# Patient Record
Sex: MALE | Race: WHITE | Employment: OTHER | URBAN - NONMETROPOLITAN AREA
[De-identification: names, ages, dates, MRNs, and addresses within clinical notes are randomized per-mention and may not be internally consistent; named-entity substitution may affect disease eponyms.]

---

## 2022-12-18 NOTE — PROGRESS NOTES
[facility]   Date Of Service: 12/22/2022  Provider: Clare Ashton DO, DO  Name: Herbert Henderson   MRN: [id number]    Chief Complaint(s)      Chief Complaint   Patient presents with    New Patient     Possible rheumatoid arthritis vs weakness of hands     Pt stated pain 3/10 - Lower back, bilateral thumbs. States he has weakness in bilateral hands. History of Present illness (HPI)    Herbert Henderson   is a(n)82 y.o. male with a hx of   referred by Zac Chatterjee DO for evaluation of  possble Rheuamtoid arthritis vs weakness of hands    Pain along the bilat thumb, weakness bilat hands since 2021  and progressively worsening. Limited ROm of the fingers for the past year. Weakness hands for the sapphire year. Evaluation by PCP who found esr elevation and low positive CCP. Lower back pain x 8 years w/o preceeding injury. \"Sciatiac\" bilat typically with prolonged standing. Prior left wrist fracture around 7-10 year. Currently having difficulty with toileting, buttons. Lifting a gallon of milk w/ the hands. Arthralgia w/ aching  tyipcally greatest in mornings, ranging from 3-5/10. Aggravating factors: cold warmth, and weather changes Back - prolonged sitting, standing. Hands - increased use. Alleviating factors: mobic - some relief  (daily dosing cuased anemia) , warmth. Previous therapy: none. Associated symptoms:   Swelling - intermittent swelling of the hands reported. AM stiffness 5 min. Numbness ssensation in the along the pips bilateral hands.    + gelling      -denies Photosenstivity, Rash, dry mouth/dry eyes, oral/nasal sores, Raynaud's, digital ulcerations, skin tightening, renal disease,foamy urination, hematuria, sz's, blood clots, , AIHA,leukpenia/lymphopenia, thrombocytopenia, hair loss, serositis, arthritis.      - denies enthesitis, dactylitis, nail changes, hx of STD,  personal or family history of Psoriatic arthritis, psoriasis, ank spond,       Cancer screening: up to date. Review of Systems    Review of Systems   Constitutional: Negative. HENT: Negative. Eyes: Negative. Respiratory:  Positive for cough (sputum wtih thick white sputum) and shortness of breath. Cardiovascular:  Positive for leg swelling (x 6-7 months (dependent)). Gastrointestinal: Negative. Endocrine: Negative. Genitourinary: Negative. Musculoskeletal:  Positive for arthralgias. Skin: Negative. Neurological:  Positive for weakness and numbness. Hematological: Negative. PAST MEDICAL HISTORY     has a past medical history of Hypertension. PAST SURGICAL HISTORY     has a past surgical history that includes Cervical disc surgery (1985). FAMILY HISTORY      Family History   Problem Relation Age of Onset    Arthritis Brother        SOCIAL HISTORY     reports that he has been smoking cigarettes. He has been smoking an average of .5 packs per day. He has never used smokeless tobacco. He reports current alcohol use. He reports that he does not use drugs. ALLERGIES   No Known Allergies    CURRENT MEDICATIONS      Current Outpatient Medications:     amLODIPine (NORVASC) 10 MG tablet, TAKE 1 TABLET BY MOUTH EVERY DAY, Disp: , Rfl:     metoprolol succinate (TOPROL XL) 100 MG extended release tablet, TAKE 1 TABLET (100 MG) BY ORAL ROUTE ONCE DAILY, STOP LABETALOL, Disp: , Rfl:     Glucosamine 500 MG CAPS, Take by mouth, Disp: , Rfl:     Misc Natural Products (PYCNOGENOL COMPLEX PO), Take by mouth, Disp: , Rfl:     PHYSICAL EXAMINATION / OBJECTIVE     Objective:  BP (!) 172/80 (Site: Left Upper Arm, Position: Sitting, Cuff Size: Large Adult)   Pulse 63   Ht 5' 8\" (1.727 m)   Wt 218 lb (98.9 kg)   SpO2 95%   BMI 33.15 kg/m²     Physical Exam      General Appearance:   alert and oriented to person, place and time well-developed and well nourished  Physch : appropriate affects ,   Head:  Normocephalic and atraumatic  Eyes: No gross abnormalities. ,  PERRL, Sclera nonicteric, conjunctiva non-INJECTED  ENT:  MMM,  no deformities , NO oral/nasal sores, Non-tender sinuses. Neck:  Neck supple, Non-tender, No  mass, thyromegaly,    Lymph:  No cervical  or  supraclavicular lymph node swelling. Pulmonary/Chest:  CTA bilat. , normal air movement, no respiratory distress  Cardiovascular:  Normal rate, + S1 and S2,  NO murmurs , rubs, gallups,     * edema  Neurologic:  gait and coordination normal and speech normal  Skin:  Skin color and temp ,  No rashes or lesions  -- onycholysis toenails. Extremities:  No clubbing ,     Musculoskeletal:  3/5  strength,  4/5 elbow ext,  4+ to 5 shoulder, biceps,   DTR 1/4 right bicpe. 2/4 left bicep, triceps, bracioradialis    Upper extremities:    -- forearm atrophy bilat   SHOULDERS - tender bilateral.  ,   ELBOWS +  tinel bilat ,   WRISTS  + tinel left   HANDS/FINGERS - inability to make a composit fist.    Finkelstein test bilat,    Cmc grind     Lower extremities:  HIPS nt  KNEES nt  ANKLES nt   FEET : tender mtps bilat     Spine:   C-spine, T-spine & L-spine:  Non-tender , ROM  limited ,  +  shober @ 11.5 cm, negativ  pipo, +  Occiput to wall , SLR/Cross SLR.        LABS        CBC  Lab Results   Component Value Date/Time    WBC 6.9 12/22/2022 10:06 AM    RBC 4.53 12/22/2022 10:06 AM    HGB 14.3 12/22/2022 10:06 AM    HCT 43.1 12/22/2022 10:06 AM    MCV 95.1 12/22/2022 10:06 AM    MCH 31.6 12/22/2022 10:06 AM    MCHC 33.2 12/22/2022 10:06 AM     12/22/2022 10:06 AM       CMP  No results found for: GLU, CALCIUM, LABALBU, PROT, NA, K, CO2, CL, BUN, CREATININE, ALKPHOS, ALT, AST    HgBA1c: No components found for: HGBA1C    No results found for: TSHFT4, TSH  No results found for: VITD25      No results found for: ANASCRN  No results found for: SSA  No results found for: SSB  No results found for: ANTI-SMITH  No results found for: DSDNAAB   No results found for: ANTIRNP  No results found for: C3, C4  No results found for: CCPAB  No results found for: RF    No components found for: CANCASCRN, APANCASCRN  No results found for: SEDRATE  No results found for: CRP    Esr 31 mm/hr, (<20)  CCP : 10.8  (>3 positive)     RADIOLOGY:       ASSESSMENT/PLAN      1. Polyarthralgia    2. Chronic midline low back pain without sciatica    3. Hand muscle weakness    4. Paresthesia and pain of both upper extremities          # Polyarthralgia   # Chronic low backp ain w/ limited ROm and bilateral radiculopathy   # CCP  10 (<3.1)  # Esr elevation     - hands  and lower back. Back pain x 8 years, hands 2021. Weakness of hands , forearm astroph. Denies AM stiffness. + gelling. Relief w/ mobic. Exam with no synovitis. + tender MTPs, and finkelstein test bilateral. Inability to make a fist bilateral. Mild nail changes. No personal or family hx of CTD. Brother w/ arthritis. -- evaluation for spondyloarthropathy, rheumatoid arthritis or other connective tissue disease as outlined below. # H/o cervical spine surgery   # Bilat hands weakness  # Partesthesia  bilat forearms   - ? Cervical radiculopathy vs entropmentneuropathy . Inclusion body myositis is possible given the distal extremity weakness,     1. Polyarthralgia    2. Chronic midline low back pain without sciatica    3. Hand muscle weakness    4. Paresthesia and pain of both upper extremities  -     CK; Future  -     Aldolase; Future  -     Lactate Dehydrogenase; Future  -     XR HAND RIGHT (MIN 3 VIEWS); Future  -     XR Cervical Spine 2 or 3 VW; Future  -     XR SACROILIAC JOINTS (MIN 3 VIEWS); Future  -     XR LUMBAR SPINE (2-3 VIEWS); Future  -     CBC with Auto Differential; Future  -     Comprehensive Metabolic Panel; Future  -     Sedimentation Rate; Future  -     C-Reactive Protein; Future  -     Electrophoresis Protein, Serum; Future        Return in about 3 months (around 3/22/2023).     Electronically signed by Barry Thompson DO on 12/22/2022 at 12:03 PM    New Prescriptions    No medications on file       12/22/2022       The risks and benefits of my recommendations, as well as other treatment options, benefits and side effects were discussed with the patient today. Questions were answered. Thank you for allowing me to participate in the care of this patient. Please call if there are any questions.

## 2022-12-22 ENCOUNTER — NURSE ONLY (OUTPATIENT)
Dept: LAB | Age: 82
End: 2022-12-22

## 2022-12-22 ENCOUNTER — OFFICE VISIT (OUTPATIENT)
Dept: RHEUMATOLOGY | Age: 82
End: 2022-12-22
Payer: MEDICARE

## 2022-12-22 VITALS
DIASTOLIC BLOOD PRESSURE: 80 MMHG | BODY MASS INDEX: 33.04 KG/M2 | HEIGHT: 68 IN | HEART RATE: 63 BPM | OXYGEN SATURATION: 95 % | WEIGHT: 218 LBS | SYSTOLIC BLOOD PRESSURE: 172 MMHG

## 2022-12-22 DIAGNOSIS — R20.2 PARESTHESIA AND PAIN OF BOTH UPPER EXTREMITIES: ICD-10-CM

## 2022-12-22 DIAGNOSIS — M25.50 POLYARTHRALGIA: ICD-10-CM

## 2022-12-22 DIAGNOSIS — M54.50 CHRONIC MIDLINE LOW BACK PAIN WITHOUT SCIATICA: ICD-10-CM

## 2022-12-22 DIAGNOSIS — M79.602 PARESTHESIA AND PAIN OF BOTH UPPER EXTREMITIES: ICD-10-CM

## 2022-12-22 DIAGNOSIS — M25.50 POLYARTHRALGIA: Primary | ICD-10-CM

## 2022-12-22 DIAGNOSIS — M62.81 HAND MUSCLE WEAKNESS: ICD-10-CM

## 2022-12-22 DIAGNOSIS — M79.601 PARESTHESIA AND PAIN OF BOTH UPPER EXTREMITIES: ICD-10-CM

## 2022-12-22 DIAGNOSIS — G89.29 CHRONIC MIDLINE LOW BACK PAIN WITHOUT SCIATICA: ICD-10-CM

## 2022-12-22 LAB
ALBUMIN SERPL-MCNC: 4.3 G/DL (ref 3.5–5.1)
ALP BLD-CCNC: 99 U/L (ref 38–126)
ALT SERPL-CCNC: 22 U/L (ref 11–66)
ANION GAP SERPL CALCULATED.3IONS-SCNC: 14 MEQ/L (ref 8–16)
AST SERPL-CCNC: 23 U/L (ref 5–40)
BASOPHILS # BLD: 1 %
BASOPHILS ABSOLUTE: 0.1 THOU/MM3 (ref 0–0.1)
BILIRUB SERPL-MCNC: 0.4 MG/DL (ref 0.3–1.2)
BUN BLDV-MCNC: 14 MG/DL (ref 7–22)
C-REACTIVE PROTEIN: < 0.3 MG/DL (ref 0–1)
CALCIUM SERPL-MCNC: 9.8 MG/DL (ref 8.5–10.5)
CHLORIDE BLD-SCNC: 101 MEQ/L (ref 98–111)
CO2: 24 MEQ/L (ref 23–33)
CREAT SERPL-MCNC: 0.5 MG/DL (ref 0.4–1.2)
EOSINOPHIL # BLD: 3.9 %
EOSINOPHILS ABSOLUTE: 0.3 THOU/MM3 (ref 0–0.4)
ERYTHROCYTE [DISTWIDTH] IN BLOOD BY AUTOMATED COUNT: 12.9 % (ref 11.5–14.5)
ERYTHROCYTE [DISTWIDTH] IN BLOOD BY AUTOMATED COUNT: 45.3 FL (ref 35–45)
GFR SERPL CREATININE-BSD FRML MDRD: > 60 ML/MIN/1.73M2
GLUCOSE BLD-MCNC: 105 MG/DL (ref 70–108)
HCT VFR BLD CALC: 43.1 % (ref 42–52)
HEMOGLOBIN: 14.3 GM/DL (ref 14–18)
IMMATURE GRANS (ABS): 0.02 THOU/MM3 (ref 0–0.07)
IMMATURE GRANULOCYTES: 0.3 %
LD: 211 U/L (ref 100–190)
LYMPHOCYTES # BLD: 32.8 %
LYMPHOCYTES ABSOLUTE: 2.3 THOU/MM3 (ref 1–4.8)
MCH RBC QN AUTO: 31.6 PG (ref 26–33)
MCHC RBC AUTO-ENTMCNC: 33.2 GM/DL (ref 32.2–35.5)
MCV RBC AUTO: 95.1 FL (ref 80–94)
MONOCYTES # BLD: 8.5 %
MONOCYTES ABSOLUTE: 0.6 THOU/MM3 (ref 0.4–1.3)
NUCLEATED RED BLOOD CELLS: 0 /100 WBC
PLATELET # BLD: 263 THOU/MM3 (ref 130–400)
PMV BLD AUTO: 10 FL (ref 9.4–12.4)
POTASSIUM SERPL-SCNC: 4.6 MEQ/L (ref 3.5–5.2)
RBC # BLD: 4.53 MILL/MM3 (ref 4.7–6.1)
SEDIMENTATION RATE, ERYTHROCYTE: 18 MM/HR (ref 0–10)
SEG NEUTROPHILS: 53.5 %
SEGMENTED NEUTROPHILS ABSOLUTE COUNT: 3.7 THOU/MM3 (ref 1.8–7.7)
SODIUM BLD-SCNC: 139 MEQ/L (ref 135–145)
TOTAL CK: 177 U/L (ref 55–170)
TOTAL PROTEIN: 7.5 G/DL (ref 6.1–8)
WBC # BLD: 6.9 THOU/MM3 (ref 4.8–10.8)

## 2022-12-22 PROCEDURE — 4004F PT TOBACCO SCREEN RCVD TLK: CPT | Performed by: INTERNAL MEDICINE

## 2022-12-22 PROCEDURE — G8417 CALC BMI ABV UP PARAM F/U: HCPCS | Performed by: INTERNAL MEDICINE

## 2022-12-22 PROCEDURE — 99214 OFFICE O/P EST MOD 30 MIN: CPT | Performed by: INTERNAL MEDICINE

## 2022-12-22 PROCEDURE — G8427 DOCREV CUR MEDS BY ELIG CLIN: HCPCS | Performed by: INTERNAL MEDICINE

## 2022-12-22 PROCEDURE — 1123F ACP DISCUSS/DSCN MKR DOCD: CPT | Performed by: INTERNAL MEDICINE

## 2022-12-22 PROCEDURE — G8484 FLU IMMUNIZE NO ADMIN: HCPCS | Performed by: INTERNAL MEDICINE

## 2022-12-22 RX ORDER — AMLODIPINE BESYLATE 10 MG/1
TABLET ORAL
COMMUNITY
Start: 2022-12-04

## 2022-12-22 RX ORDER — METOPROLOL SUCCINATE 100 MG/1
TABLET, EXTENDED RELEASE ORAL
COMMUNITY
Start: 2022-10-06

## 2022-12-22 RX ORDER — GLUCOSAMINE SULFATE 500 MG
CAPSULE ORAL
COMMUNITY

## 2022-12-22 ASSESSMENT — ENCOUNTER SYMPTOMS
COUGH: 1
GASTROINTESTINAL NEGATIVE: 1
EYES NEGATIVE: 1
SHORTNESS OF BREATH: 1

## 2022-12-22 NOTE — RESULT ENCOUNTER NOTE
The blood test are notable for a very mild elvation of the sed rate. The creatinine kinase and LD (lactate Dehydrogenase) are mildly elevated. This can indicate muscle inflammation but several test are current pending and once these return you will be notified of any abnormalities.

## 2022-12-24 LAB — ALDOLASE: 5.1 U/L (ref 1.2–7.6)

## 2022-12-27 ENCOUNTER — TELEPHONE (OUTPATIENT)
Dept: RHEUMATOLOGY | Age: 82
End: 2022-12-27

## 2022-12-27 NOTE — TELEPHONE ENCOUNTER
----- Message from Maria Antonia Turcios DO sent at 12/22/2022  4:08 PM EST -----  The blood test are notable for a very mild elvation of the sed rate. The creatinine kinase and LD (lactate Dehydrogenase) are mildly elevated. This can indicate muscle inflammation but several test are current pending and once these return you will be notified of any abnormalities.

## 2022-12-27 NOTE — TELEPHONE ENCOUNTER
----- Message from Destinee Wylie DO sent at 12/26/2022  9:26 AM EST -----  The blood test to evaluate for multiple myeloma and abnormal proteins in the blood was negative. Please have the x-rays completed at your earliest convenience.

## 2022-12-28 DIAGNOSIS — M79.601 PARESTHESIA AND PAIN OF BOTH UPPER EXTREMITIES: ICD-10-CM

## 2022-12-28 DIAGNOSIS — G89.29 CHRONIC MIDLINE LOW BACK PAIN WITHOUT SCIATICA: ICD-10-CM

## 2022-12-28 DIAGNOSIS — M62.81 HAND MUSCLE WEAKNESS: ICD-10-CM

## 2022-12-28 DIAGNOSIS — R20.2 PARESTHESIA AND PAIN OF BOTH UPPER EXTREMITIES: ICD-10-CM

## 2022-12-28 DIAGNOSIS — M25.50 POLYARTHRALGIA: ICD-10-CM

## 2022-12-28 DIAGNOSIS — M54.50 CHRONIC MIDLINE LOW BACK PAIN WITHOUT SCIATICA: ICD-10-CM

## 2022-12-28 DIAGNOSIS — M79.602 PARESTHESIA AND PAIN OF BOTH UPPER EXTREMITIES: ICD-10-CM

## 2022-12-29 ENCOUNTER — TELEPHONE (OUTPATIENT)
Dept: RHEUMATOLOGY | Age: 82
End: 2022-12-29

## 2022-12-29 DIAGNOSIS — M79.602 PARESTHESIA AND PAIN OF BOTH UPPER EXTREMITIES: ICD-10-CM

## 2022-12-29 DIAGNOSIS — M62.81 HAND MUSCLE WEAKNESS: ICD-10-CM

## 2022-12-29 DIAGNOSIS — M79.601 PARESTHESIA AND PAIN OF BOTH UPPER EXTREMITIES: ICD-10-CM

## 2022-12-29 DIAGNOSIS — R20.2 PARESTHESIA AND PAIN OF BOTH UPPER EXTREMITIES: ICD-10-CM

## 2022-12-29 DIAGNOSIS — M25.50 POLYARTHRALGIA: Primary | ICD-10-CM

## 2022-12-29 NOTE — TELEPHONE ENCOUNTER
----- Message from Ken Castro DO sent at 12/28/2022  5:10 PM EST -----  Please inform the patient that the x-ray of the lower back (lumbar spine) revealed severe degenerative arthritic changes with facet arthropathy, degenerative disc disease and reported endplate changes. The x-ray of the sacroiliac joints revealed mild degenerative arthritis affecting the sacroiliac joints.     Please request the imaging from [facility] to be pushed over to our system and if unable to have this completed please have them send a disc with the imaging

## 2022-12-29 NOTE — TELEPHONE ENCOUNTER
Tucker Jones from [facility] called regarding the patients Advise test. They are not able to use the patients blood due to it being frozen from shipping issues. He is asking for a repeat order. I will notify the pt regarding this matter.

## 2022-12-30 NOTE — TELEPHONE ENCOUNTER
Diagnosis Orders   1. Polyarthralgia  Miscellaneous Sendout      2. Paresthesia and pain of both upper extremities  Miscellaneous Sendout      3.  Hand muscle weakness  Miscellaneous Sendout

## 2023-01-03 DIAGNOSIS — M54.50 CHRONIC MIDLINE LOW BACK PAIN WITHOUT SCIATICA: ICD-10-CM

## 2023-01-03 DIAGNOSIS — M62.81 HAND MUSCLE WEAKNESS: ICD-10-CM

## 2023-01-03 DIAGNOSIS — M79.601 PARESTHESIA AND PAIN OF BOTH UPPER EXTREMITIES: ICD-10-CM

## 2023-01-03 DIAGNOSIS — G89.29 CHRONIC MIDLINE LOW BACK PAIN WITHOUT SCIATICA: ICD-10-CM

## 2023-01-03 DIAGNOSIS — M25.50 POLYARTHRALGIA: ICD-10-CM

## 2023-01-03 DIAGNOSIS — M79.602 PARESTHESIA AND PAIN OF BOTH UPPER EXTREMITIES: ICD-10-CM

## 2023-01-03 DIAGNOSIS — R20.2 PARESTHESIA AND PAIN OF BOTH UPPER EXTREMITIES: ICD-10-CM

## 2023-01-03 NOTE — RESULT ENCOUNTER NOTE
Please call and ask Arvil Glance to push over the x-rays and have them matched with the patient's chart locally with radiology. Please inform patient that the x-rays of the hands did reveal significant osteoarthritic features along the base of the thumb soft tissue swelling around the wrist.    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   We will be requesting the results and the imaging to be sent over to our system for review

## 2023-01-05 ENCOUNTER — NURSE ONLY (OUTPATIENT)
Dept: LAB | Age: 83
End: 2023-01-05

## 2023-01-05 DIAGNOSIS — M25.50 POLYARTHRALGIA: ICD-10-CM

## 2023-01-05 DIAGNOSIS — M62.81 HAND MUSCLE WEAKNESS: ICD-10-CM

## 2023-01-05 DIAGNOSIS — R20.2 PARESTHESIA AND PAIN OF BOTH UPPER EXTREMITIES: ICD-10-CM

## 2023-01-05 DIAGNOSIS — M79.602 PARESTHESIA AND PAIN OF BOTH UPPER EXTREMITIES: ICD-10-CM

## 2023-01-05 DIAGNOSIS — M79.601 PARESTHESIA AND PAIN OF BOTH UPPER EXTREMITIES: ICD-10-CM

## 2023-01-06 ENCOUNTER — TELEPHONE (OUTPATIENT)
Dept: RHEUMATOLOGY | Age: 83
End: 2023-01-06

## 2023-01-06 NOTE — TELEPHONE ENCOUNTER
----- Message from Mojgan Qureshi DO sent at 1/3/2023 12:42 PM EST -----  Please call and ask [facility] Berny to push over the x-rays and have them matched with the patient's chart locally with radiology. Please inform patient that the x-rays of the hands did reveal significant osteoarthritic features along the base of the thumb soft tissue swelling around the wrist.    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   We will be requesting the results and the imaging to be sent over to our system for review

## 2023-01-12 ENCOUNTER — HOSPITAL ENCOUNTER (OUTPATIENT)
Dept: GENERAL RADIOLOGY | Age: 83
Discharge: HOME | End: 2023-01-12

## 2023-01-12 ENCOUNTER — TELEPHONE (OUTPATIENT)
Dept: RHEUMATOLOGY | Age: 83
End: 2023-01-12

## 2023-01-12 DIAGNOSIS — Z00.6 EXAMINATION FOR NORMAL COMPARISON FOR CLINICAL RESEARCH: ICD-10-CM

## 2023-01-12 NOTE — TELEPHONE ENCOUNTER
----- Message from Maria Antonia Turcios DO sent at 1/10/2023  2:24 PM EST -----  Repeat lab testing to evaluate for rheumatoid arthritis and other inflammatory conditions were negative. If the joint pains worsen or if you develop new symptoms please contact the office. At this time I would like to refrain from starting any additional medications.

## 2023-02-23 ENCOUNTER — OFFICE VISIT (OUTPATIENT)
Dept: RHEUMATOLOGY | Age: 83
End: 2023-02-23
Payer: MEDICARE

## 2023-02-23 VITALS
HEIGHT: 68 IN | DIASTOLIC BLOOD PRESSURE: 78 MMHG | SYSTOLIC BLOOD PRESSURE: 136 MMHG | OXYGEN SATURATION: 96 % | HEART RATE: 84 BPM | WEIGHT: 219 LBS | BODY MASS INDEX: 33.19 KG/M2

## 2023-02-23 DIAGNOSIS — M79.601 PARESTHESIA AND PAIN OF BOTH UPPER EXTREMITIES: ICD-10-CM

## 2023-02-23 DIAGNOSIS — M54.50 CHRONIC MIDLINE LOW BACK PAIN WITHOUT SCIATICA: ICD-10-CM

## 2023-02-23 DIAGNOSIS — M25.50 POLYARTHRALGIA: Primary | ICD-10-CM

## 2023-02-23 DIAGNOSIS — R20.2 PARESTHESIA AND PAIN OF BOTH UPPER EXTREMITIES: ICD-10-CM

## 2023-02-23 DIAGNOSIS — M62.81 HAND MUSCLE WEAKNESS: ICD-10-CM

## 2023-02-23 DIAGNOSIS — M79.602 PARESTHESIA AND PAIN OF BOTH UPPER EXTREMITIES: ICD-10-CM

## 2023-02-23 DIAGNOSIS — G89.29 CHRONIC MIDLINE LOW BACK PAIN WITHOUT SCIATICA: ICD-10-CM

## 2023-02-23 PROCEDURE — G8427 DOCREV CUR MEDS BY ELIG CLIN: HCPCS | Performed by: NURSE PRACTITIONER

## 2023-02-23 PROCEDURE — 99214 OFFICE O/P EST MOD 30 MIN: CPT | Performed by: NURSE PRACTITIONER

## 2023-02-23 PROCEDURE — 1123F ACP DISCUSS/DSCN MKR DOCD: CPT | Performed by: NURSE PRACTITIONER

## 2023-02-23 PROCEDURE — G8484 FLU IMMUNIZE NO ADMIN: HCPCS | Performed by: NURSE PRACTITIONER

## 2023-02-23 PROCEDURE — G8417 CALC BMI ABV UP PARAM F/U: HCPCS | Performed by: NURSE PRACTITIONER

## 2023-02-23 PROCEDURE — 4004F PT TOBACCO SCREEN RCVD TLK: CPT | Performed by: NURSE PRACTITIONER

## 2023-02-23 ASSESSMENT — ENCOUNTER SYMPTOMS
EYE PAIN: 0
CONSTIPATION: 0
COUGH: 1
BACK PAIN: 1
TROUBLE SWALLOWING: 0
ABDOMINAL PAIN: 0
EYE ITCHING: 0
NAUSEA: 0
DIARRHEA: 0
SHORTNESS OF BREATH: 1

## 2023-02-23 NOTE — PROGRESS NOTES
[facility] RHEUMATOLOGY FOLLOW UP NOTE       Date Of Service: 2/23/2023  Provider: SANGITA Carey - CNP    Name: Cory Cr   MRN: [id number]    Chief Complaint(s)     Chief Complaint   Patient presents with    Follow-up     2 month f/u Polyarthralgia    Lower back, bilateral hands         History of Present Illness (HPI)     Cory Cr  is a(n)82 y.o. male with a hx of here for the f/u evaluation of polyarthralgia, hand weakness    Interval hx:    - no new symptoms      pain affecting the hands, low back  Pain on a scale 0-10: 4.5/10  Type of pain: aching  Timing:mornings  Aggravating factors: cold, warmth, weather changes. Back: prolonged sitting, standing. Hands: increased use  Alleviating factors: mobic, warmth    Associated symptoms:  denies swelling/  Redness/ warmth, + AM stiffness lasting ~ all day in back      REVIEW OF SYSTEMS: (ROS)    Review of Systems   Constitutional:  Negative for fatigue, fever and unexpected weight change. HENT:  Negative for congestion and trouble swallowing. Eyes:  Negative for pain and itching. Respiratory:  Positive for cough and shortness of breath. Cardiovascular:  Positive for leg swelling. Negative for chest pain. Gastrointestinal:  Negative for abdominal pain, constipation, diarrhea and nausea. Endocrine: Negative for cold intolerance and heat intolerance. Genitourinary:  Negative for difficulty urinating, frequency and urgency. Musculoskeletal:  Positive for arthralgias and back pain. Negative for joint swelling. Skin:  Negative for rash. Neurological:  Positive for weakness and numbness. Negative for dizziness and headaches. Psychiatric/Behavioral:  The patient is not nervous/anxious.       PAST MEDICAL HISTORY      Past Medical History:   Diagnosis Date    Hypertension        PAST SURGICAL HISTORY      Past Surgical History:   Procedure Laterality Date    CERVICAL DISC SURGERY  1985    Disk Repair       FAMILY HISTORY      Family History   Problem Relation Age of Onset    Arthritis Brother        SOCIAL HISTORY      Social History       Tobacco History       Smoking Status  Every Day Smoking Frequency  0.50 packs/day Smoking Tobacco Type  Cigarettes      Smokeless Tobacco Use  Never              Alcohol History       Alcohol Use Status  Yes Comment  Social              Drug Use       Drug Use Status  Never              Sexual Activity       Sexually Active  Not Asked                    ALLERGIES   No Known Allergies    CURRENT MEDICATIONS      Current Outpatient Medications   Medication Sig Dispense Refill    amLODIPine (NORVASC) 10 MG tablet TAKE 1 TABLET BY MOUTH EVERY DAY      metoprolol succinate (TOPROL XL) 100 MG extended release tablet TAKE 1 TABLET (100 MG) BY ORAL ROUTE ONCE DAILY, STOP LABETALOL      Glucosamine 500 MG CAPS Take by mouth      Misc Natural Products (PYCNOGENOL COMPLEX PO) Take by mouth       No current facility-administered medications for this visit. PHYSICAL EXAMINATION / OBJECTIVE   Objective:  BP (!) 160/80 (Site: Right Upper Arm, Position: Sitting, Cuff Size: Medium Adult)   Pulse 87   Ht 5' 7.99\" (1.727 m)   Wt 219 lb (99.3 kg)   SpO2 98%   BMI 33.31 kg/m²     Physical Exam  Vitals reviewed. Constitutional:       Appearance: He is well-developed. Cardiovascular:      Rate and Rhythm: Normal rate and regular rhythm. Pulmonary:      Effort: Pulmonary effort is normal.      Breath sounds: Normal breath sounds. Musculoskeletal:      Cervical back: Normal range of motion and neck supple. Skin:     General: Skin is warm and dry. Findings: No rash. Comments: Onycholysis toenails   Neurological:      Mental Status: He is alert and oriented to person, place, and time. Psychiatric:         Thought Content:  Thought content normal.       Upper extremities:    SHOULDERS tender bilat ,   ELBOWS nontender, + tinel bilat,   WRISTS nontender, + tinel left,   HANDS/FINGERS nontender, unable to make composite fists    + muscle wasting intrinsic hand muscles and forearms    Lower extremities:  HIPS nontender  KNEES nontender  ANKLES nontender   FEET : nontneder     Spine:   nontender       LABS    CBC  Lab Results   Component Value Date/Time    WBC 6.3 01/17/2023 09:15 AM    WBC 6.9 12/22/2022 10:06 AM    RBC 4.43 01/17/2023 09:15 AM    HGB 13.7 01/17/2023 09:15 AM    HCT 40.3 01/17/2023 09:15 AM    MCV 91.0 01/17/2023 09:15 AM    MCH 30.9 01/17/2023 09:15 AM    MCHC 34.0 01/17/2023 09:15 AM    RDW 12.7 01/17/2023 09:15 AM     01/17/2023 09:15 AM     12/22/2022 10:06 AM       CMP  Lab Results   Component Value Date/Time    CALCIUM 9.2 01/17/2023 09:15 AM    LABALBU 4.2 01/17/2023 09:15 AM    PROT 6.9 01/17/2023 09:15 AM     01/17/2023 09:15 AM    K 4.6 01/17/2023 09:15 AM    CO2 26 01/17/2023 09:15 AM     01/17/2023 09:15 AM    BUN 15 01/17/2023 09:15 AM    CREATININE 0.62 01/17/2023 09:15 AM    ALKPHOS 91 01/17/2023 09:15 AM    ALKPHOS 99 12/22/2022 10:06 AM    ALT 20 01/17/2023 09:15 AM    AST 21 01/17/2023 09:15 AM       HgBA1c: No components found for: HGBA1C    No results found for: VITD25      No results found for: ANASCRN  No results found for: SSA  No results found for: SSB  No results found for: ANTI-SMITH  No results found for: DSDNAAB   No results found for: ANTIRNP  No results found for: C3, C4  No results found for: CCPAB  No results found for: RF    No components found for: CANCASCRN, APANCASCRN  Lab Results   Component Value Date    SEDRATE 18 (H) 12/22/2022     Lab Results   Component Value Date    CRP < 0.30 12/22/2022       RADIOLOGY:         ASSESSMENT/PLAN    Assessment   Plan       Polyarthralgia  ESR elevation  LDH and CK elevation (mild)  - hand and low back. Back pain x 8 years, hands started in 2021. Weakness of hands, forearm atrophy. Denies AM stiffness. + gelling. Relief w/ mobic. Exam w/ no synovitis. + tender MTPs and finkelstein testing bilat. Inability to make a fist bilaterally. Mild nail changes. No personal or fmhx of CTD. Brother w/ arthritis. AVISE CTD panel negative. - EMG bilateral upper extremities ordered to evaluate for cervical radiculopathy vs entrapment vs inflammatory myopathy given distal muscle weakness, ESR elevation, mild LDH and CK elevation    H/o cervical spine surgery  Bilateral hand weakness  Paresthesia bilateral forearms   - EMG ordered as above    Chronic low back pain w/ limited ROM and bilateral radiculopathy  - Xray lumbar spine with some bone spurring and severe degenerative changes, however no significant SI joint abnormalities. No follow-ups on file. Electronically signed by SANGITA Rasmussen CNP on 2/23/2023 at 9:51 AM    New Prescriptions    No medications on file       Thank you for allowing me to participate in the care of this patient. Please call if there are any questions.

## 2023-03-09 ENCOUNTER — PROCEDURE VISIT (OUTPATIENT)
Dept: NEUROLOGY | Age: 83
End: 2023-03-09

## 2023-03-09 DIAGNOSIS — M54.12 CERVICAL RADICULOPATHY: ICD-10-CM

## 2023-03-09 DIAGNOSIS — R20.0 BILATERAL HAND NUMBNESS: ICD-10-CM

## 2023-03-09 DIAGNOSIS — R29.898 BILATERAL ARM WEAKNESS: Primary | ICD-10-CM

## 2023-03-14 ENCOUNTER — TELEPHONE (OUTPATIENT)
Dept: RHEUMATOLOGY | Age: 83
End: 2023-03-14

## 2023-03-14 DIAGNOSIS — R29.898 BILATERAL ARM WEAKNESS: ICD-10-CM

## 2023-03-14 DIAGNOSIS — M54.12 CERVICAL RADICULOPATHY: ICD-10-CM

## 2023-03-14 DIAGNOSIS — M54.2 CERVICALGIA: Primary | ICD-10-CM

## 2023-03-14 NOTE — TELEPHONE ENCOUNTER
----- Message from SANGITA Rasmussen CNP sent at 3/14/2023  4:12 PM EDT -----  Please let patient know that the nerve conduction study showed a chronic, severely pinches nerve at C5-6 and C6-7 in the neck. There was also evidence of mild to moderate bilateral carpal tunnel syndrome. These could be causing some of the symptoms in the hands . Please ask the patient if he would be willing to see ortho for the carpal tunnel. Also- can you please find out when his cervical surgery was and who did it? Does he still see anyone for the neck? Any recent MRI's of the neck?

## 2023-03-14 NOTE — TELEPHONE ENCOUNTER
Pt informed and he states he would like to think about a referral to ortho. He also states that his surgery was done at [facility] in the 80's and doesn't remember who the doctor was, he doesn't see anyone currently for his neck and has not had a recent MRI of his neck.

## 2023-03-15 NOTE — TELEPHONE ENCOUNTER
Would he be willing to get an MRI of the cervical spine to further evaluate the neck given the abnormal EMG?

## 2023-04-11 DIAGNOSIS — M54.2 CERVICALGIA: ICD-10-CM

## 2023-04-11 DIAGNOSIS — M54.12 CERVICAL RADICULOPATHY: ICD-10-CM

## 2023-04-11 DIAGNOSIS — R29.898 BILATERAL ARM WEAKNESS: ICD-10-CM

## 2023-04-12 ENCOUNTER — TELEPHONE (OUTPATIENT)
Dept: RHEUMATOLOGY | Age: 83
End: 2023-04-12

## 2023-04-12 DIAGNOSIS — M48.02 CERVICAL SPINAL STENOSIS: Primary | ICD-10-CM

## 2023-06-26 ENCOUNTER — OFFICE VISIT (OUTPATIENT)
Dept: RHEUMATOLOGY | Age: 83
End: 2023-06-26
Payer: MEDICARE

## 2023-06-26 ENCOUNTER — OFFICE VISIT (OUTPATIENT)
Dept: NEUROSURGERY | Age: 83
End: 2023-06-26

## 2023-06-26 ENCOUNTER — HOSPITAL ENCOUNTER (OUTPATIENT)
Dept: MRI IMAGING | Age: 83
Discharge: HOME | End: 2023-06-26
Attending: RADIOLOGY

## 2023-06-26 VITALS
BODY MASS INDEX: 33.07 KG/M2 | OXYGEN SATURATION: 95 % | DIASTOLIC BLOOD PRESSURE: 62 MMHG | HEIGHT: 68 IN | WEIGHT: 218.2 LBS | HEART RATE: 57 BPM | SYSTOLIC BLOOD PRESSURE: 136 MMHG

## 2023-06-26 VITALS
DIASTOLIC BLOOD PRESSURE: 80 MMHG | SYSTOLIC BLOOD PRESSURE: 126 MMHG | WEIGHT: 218 LBS | BODY MASS INDEX: 33.04 KG/M2 | HEIGHT: 68 IN

## 2023-06-26 DIAGNOSIS — R20.2 PARESTHESIA AND PAIN OF BOTH UPPER EXTREMITIES: ICD-10-CM

## 2023-06-26 DIAGNOSIS — M79.601 PARESTHESIA AND PAIN OF BOTH UPPER EXTREMITIES: ICD-10-CM

## 2023-06-26 DIAGNOSIS — R29.898 BILATERAL ARM WEAKNESS: ICD-10-CM

## 2023-06-26 DIAGNOSIS — M79.602 PARESTHESIA AND PAIN OF BOTH UPPER EXTREMITIES: ICD-10-CM

## 2023-06-26 DIAGNOSIS — M54.50 CHRONIC MIDLINE LOW BACK PAIN WITHOUT SCIATICA: ICD-10-CM

## 2023-06-26 DIAGNOSIS — M54.12 CERVICAL RADICULOPATHY: ICD-10-CM

## 2023-06-26 DIAGNOSIS — Z00.6 EXAMINATION FOR NORMAL COMPARISON FOR CLINICAL RESEARCH: ICD-10-CM

## 2023-06-26 DIAGNOSIS — M62.81 HAND MUSCLE WEAKNESS: ICD-10-CM

## 2023-06-26 DIAGNOSIS — G89.29 CHRONIC MIDLINE LOW BACK PAIN WITHOUT SCIATICA: ICD-10-CM

## 2023-06-26 DIAGNOSIS — M25.50 POLYARTHRALGIA: Primary | ICD-10-CM

## 2023-06-26 DIAGNOSIS — M48.02 CERVICAL SPINAL STENOSIS: ICD-10-CM

## 2023-06-26 DIAGNOSIS — M48.02 CERVICAL SPINAL STENOSIS: Primary | ICD-10-CM

## 2023-06-26 DIAGNOSIS — Z51.81 MEDICATION MONITORING ENCOUNTER: ICD-10-CM

## 2023-06-26 PROCEDURE — G8427 DOCREV CUR MEDS BY ELIG CLIN: HCPCS | Performed by: NURSE PRACTITIONER

## 2023-06-26 PROCEDURE — 4004F PT TOBACCO SCREEN RCVD TLK: CPT | Performed by: NURSE PRACTITIONER

## 2023-06-26 PROCEDURE — G8417 CALC BMI ABV UP PARAM F/U: HCPCS | Performed by: NURSE PRACTITIONER

## 2023-06-26 PROCEDURE — 99214 OFFICE O/P EST MOD 30 MIN: CPT | Performed by: NURSE PRACTITIONER

## 2023-06-26 PROCEDURE — 1123F ACP DISCUSS/DSCN MKR DOCD: CPT | Performed by: NURSE PRACTITIONER

## 2023-06-26 ASSESSMENT — ENCOUNTER SYMPTOMS
DIARRHEA: 0
CHEST TIGHTNESS: 0
CONSTIPATION: 0
BACK PAIN: 1
NAUSEA: 0
EYE ITCHING: 0
BACK PAIN: 1
EYE PAIN: 0
ABDOMINAL PAIN: 0
TROUBLE SWALLOWING: 0
SHORTNESS OF BREATH: 0
COUGH: 1
SHORTNESS OF BREATH: 1
APNEA: 0

## 2023-08-01 ENCOUNTER — OFFICE VISIT (OUTPATIENT)
Dept: PHYSICAL MEDICINE AND REHAB | Age: 83
End: 2023-08-01
Payer: MEDICARE

## 2023-08-01 VITALS
DIASTOLIC BLOOD PRESSURE: 78 MMHG | SYSTOLIC BLOOD PRESSURE: 124 MMHG | WEIGHT: 219 LBS | HEIGHT: 68 IN | BODY MASS INDEX: 33.19 KG/M2

## 2023-08-01 DIAGNOSIS — R20.0 BILATERAL HAND NUMBNESS: ICD-10-CM

## 2023-08-01 DIAGNOSIS — M54.12 CERVICAL RADICULITIS: Primary | ICD-10-CM

## 2023-08-01 DIAGNOSIS — M48.02 CERVICAL SPINAL STENOSIS: ICD-10-CM

## 2023-08-01 PROCEDURE — 4004F PT TOBACCO SCREEN RCVD TLK: CPT | Performed by: NURSE PRACTITIONER

## 2023-08-01 PROCEDURE — G8427 DOCREV CUR MEDS BY ELIG CLIN: HCPCS | Performed by: NURSE PRACTITIONER

## 2023-08-01 PROCEDURE — 1123F ACP DISCUSS/DSCN MKR DOCD: CPT | Performed by: NURSE PRACTITIONER

## 2023-08-01 PROCEDURE — 99205 OFFICE O/P NEW HI 60 MIN: CPT | Performed by: NURSE PRACTITIONER

## 2023-08-01 PROCEDURE — G8417 CALC BMI ABV UP PARAM F/U: HCPCS | Performed by: NURSE PRACTITIONER

## 2023-08-01 RX ORDER — GABAPENTIN 100 MG/1
100 CAPSULE ORAL 3 TIMES DAILY
Qty: 42 CAPSULE | Refills: 0 | Status: SHIPPED | OUTPATIENT
Start: 2023-08-01 | End: 2023-08-15

## 2023-08-01 ASSESSMENT — ENCOUNTER SYMPTOMS
GASTROINTESTINAL NEGATIVE: 1
BACK PAIN: 1
COUGH: 1

## 2023-08-01 NOTE — PROGRESS NOTES
HPI:     Chief Complaint: Hand numbness and decraesed strength     HPI  Patient has a medical history of HTN, polyarthralgia follows with Rheumatology, history of cervical spine surgery in 1980's C5-C7 cervical fusion. Patient recently saw Neurosurgery for evaluation on 6/26/2027 and was sent here for cervical radiculitis and cervical spinal stenosis. Patient denies any pain at all in his neck. Patient has complaints of numbness, and weakness \"dropping things\" in bilateral hands entire hand and fingers worse in left hand and worse bilaterally thumb and pointer finger. \"No pain just numbness and weakness\" If I wanted to lift my shirt up with my hand I could not pinch it\" Sometimes does get intermittent pain between thumb and pointer finger sharp pain. Pain is currently rated at 0/10 now denies any. Rates numbness 6-7/10 though but not pain. When he gets that pain between finger and thumb rates 5-7/10. Patient pain increases with using hands    Denies any headaches or any changes in bowel or bladder function     PT: No,  states he has never tried therapy, not on any pain medications \"not having pain just weakness. Has never seen pain management or any injections     Any prior spine or ortho surgeon consult and with whom Yes,  referred here by Neurosurgery   Radiology:  Reviewed Cervical MRI and Cervical CT scan  Severe degenerative changes, arthritis and multiple areas of spinal stenosis and severe foraminal stenosis   EMG reviewed   + chronic moderate severe C5-C7 cervical radiculopathy and bilateral median neuropathy        The patient has No Known Allergies. Subjective:      Review of Systems   Constitutional: Negative. HENT: Negative. Eyes:  Positive for visual disturbance. Wears corrective glasses   Respiratory:  Positive for cough. Having phlemb discharge clear, + tobacco use    Cardiovascular:  Positive for leg swelling. Gastrointestinal: Negative.     Genitourinary:

## 2023-08-18 ENCOUNTER — OFFICE VISIT (OUTPATIENT)
Dept: NEUROSURGERY | Age: 83
End: 2023-08-18

## 2023-08-18 VITALS
WEIGHT: 218.92 LBS | HEART RATE: 53 BPM | HEIGHT: 68 IN | SYSTOLIC BLOOD PRESSURE: 159 MMHG | BODY MASS INDEX: 33.18 KG/M2 | DIASTOLIC BLOOD PRESSURE: 73 MMHG

## 2023-08-18 DIAGNOSIS — M48.02 CERVICAL SPINAL STENOSIS: Primary | ICD-10-CM

## 2023-08-18 NOTE — PROGRESS NOTES
[address] [place] [address]  Dept: [phone]  Dept Fax: [phone]  Loc: [phone]    Follow-up visit  Visit Date: 8/18/2023      Wilfred Torres  is a 82 y.o. male who is returning to the office today for a follow-up visit for continuing evaluation of symptoms consistent with cervical spinal stenosis. He was most recently seen and evaluated in our office setting on 6/26/2023 as a referral by his rheumatologist.  He presented with an MRI imaged in April which revealed degenerative disc disease at multiple levels with prior C5-C7 fusion also noted. At that visit he was ambulating without assistance with complaints of worsening upper extremity weakness with transient pain and a decline in dexterity specific to manipulation of buttons and opening containers. A CT scan was ordered to rule out additional bony abnormalities contributing to symptom presentation and for surgical planning with the MRI pushed for direct viewing. He arrives today unaccompanied and ambulating without assistance with continuing issues with dexterity and imbalance. He was recently seen and evaluated by [facility] pain management with some adjustments to his gabapentin medication and has participated in physical therapy with transient results. The MRI findings were reviewed with Dr. Roxy Gosselin who is requesting a repeat of the MRI with and without contrast utilizing CISS protocols to rule out outflow obstruction of CSF around the prior fusion. In the interim the patient is encouraged to keep and maintain upcoming appointments with pain management to begin injection therapy with a follow-up with our service in approximately 4 weeks to ascertain improvements from that modality and to review findings on the latest MRI. Patient was evaluated today and is doing marginally overall. No new complaints were voiced.   Patient  lives

## 2023-09-05 ENCOUNTER — OFFICE VISIT (OUTPATIENT)
Dept: PHYSICAL MEDICINE AND REHAB | Age: 83
End: 2023-09-05
Payer: MEDICARE

## 2023-09-05 ENCOUNTER — NURSE ONLY (OUTPATIENT)
Dept: LAB | Age: 83
End: 2023-09-05

## 2023-09-05 VITALS
BODY MASS INDEX: 33.04 KG/M2 | SYSTOLIC BLOOD PRESSURE: 132 MMHG | HEIGHT: 68 IN | WEIGHT: 218 LBS | DIASTOLIC BLOOD PRESSURE: 78 MMHG

## 2023-09-05 DIAGNOSIS — M48.02 CERVICAL SPINAL STENOSIS: ICD-10-CM

## 2023-09-05 DIAGNOSIS — Z51.81 MEDICATION MONITORING ENCOUNTER: ICD-10-CM

## 2023-09-05 DIAGNOSIS — M54.12 CERVICAL RADICULITIS: ICD-10-CM

## 2023-09-05 DIAGNOSIS — R20.0 BILATERAL HAND NUMBNESS: Primary | ICD-10-CM

## 2023-09-05 LAB
ALBUMIN SERPL BCG-MCNC: 4 G/DL (ref 3.5–5.1)
ALP SERPL-CCNC: 85 U/L (ref 38–126)
ALT SERPL W/O P-5'-P-CCNC: 19 U/L (ref 11–66)
ANION GAP SERPL CALC-SCNC: 9 MEQ/L (ref 8–16)
AST SERPL-CCNC: 21 U/L (ref 5–40)
BASOPHILS ABSOLUTE: 0.1 THOU/MM3 (ref 0–0.1)
BASOPHILS NFR BLD AUTO: 0.9 %
BILIRUB SERPL-MCNC: 0.3 MG/DL (ref 0.3–1.2)
BUN SERPL-MCNC: 13 MG/DL (ref 7–22)
CALCIUM SERPL-MCNC: 9.6 MG/DL (ref 8.5–10.5)
CHLORIDE SERPL-SCNC: 102 MEQ/L (ref 98–111)
CO2 SERPL-SCNC: 27 MEQ/L (ref 23–33)
CREAT SERPL-MCNC: 0.5 MG/DL (ref 0.4–1.2)
CRP SERPL-MCNC: 0.4 MG/DL (ref 0–1)
DEPRECATED RDW RBC AUTO: 46.1 FL (ref 35–45)
EOSINOPHIL NFR BLD AUTO: 3.4 %
EOSINOPHILS ABSOLUTE: 0.2 THOU/MM3 (ref 0–0.4)
ERYTHROCYTE [DISTWIDTH] IN BLOOD BY AUTOMATED COUNT: 12.8 % (ref 11.5–14.5)
ERYTHROCYTE [SEDIMENTATION RATE] IN BLOOD BY WESTERGREN METHOD: 22 MM/HR (ref 0–10)
GFR SERPL CREATININE-BSD FRML MDRD: > 60 ML/MIN/1.73M2
GLUCOSE SERPL-MCNC: 103 MG/DL (ref 70–108)
HCT VFR BLD AUTO: 43.7 % (ref 42–52)
HGB BLD-MCNC: 14.2 GM/DL (ref 14–18)
IMM GRANULOCYTES # BLD AUTO: 0.02 THOU/MM3 (ref 0–0.07)
IMM GRANULOCYTES NFR BLD AUTO: 0.3 %
LYMPHOCYTES ABSOLUTE: 2.3 THOU/MM3 (ref 1–4.8)
LYMPHOCYTES NFR BLD AUTO: 35.3 %
MCH RBC QN AUTO: 31.6 PG (ref 26–33)
MCHC RBC AUTO-ENTMCNC: 32.5 GM/DL (ref 32.2–35.5)
MCV RBC AUTO: 97.1 FL (ref 80–94)
MONOCYTES ABSOLUTE: 0.7 THOU/MM3 (ref 0.4–1.3)
MONOCYTES NFR BLD AUTO: 10.5 %
NEUTROPHILS NFR BLD AUTO: 49.6 %
NRBC BLD AUTO-RTO: 0 /100 WBC
PLATELET # BLD AUTO: 214 THOU/MM3 (ref 130–400)
PMV BLD AUTO: 11 FL (ref 9.4–12.4)
POTASSIUM SERPL-SCNC: 4.6 MEQ/L (ref 3.5–5.2)
PROT SERPL-MCNC: 7.1 G/DL (ref 6.1–8)
RBC # BLD AUTO: 4.5 MILL/MM3 (ref 4.7–6.1)
SEGMENTED NEUTROPHILS ABSOLUTE COUNT: 3.2 THOU/MM3 (ref 1.8–7.7)
SODIUM SERPL-SCNC: 138 MEQ/L (ref 135–145)
WBC # BLD AUTO: 6.5 THOU/MM3 (ref 4.8–10.8)

## 2023-09-05 PROCEDURE — 4004F PT TOBACCO SCREEN RCVD TLK: CPT | Performed by: NURSE PRACTITIONER

## 2023-09-05 PROCEDURE — G8417 CALC BMI ABV UP PARAM F/U: HCPCS | Performed by: NURSE PRACTITIONER

## 2023-09-05 PROCEDURE — G8427 DOCREV CUR MEDS BY ELIG CLIN: HCPCS | Performed by: NURSE PRACTITIONER

## 2023-09-05 PROCEDURE — 1123F ACP DISCUSS/DSCN MKR DOCD: CPT | Performed by: NURSE PRACTITIONER

## 2023-09-05 PROCEDURE — 99214 OFFICE O/P EST MOD 30 MIN: CPT | Performed by: NURSE PRACTITIONER

## 2023-09-05 RX ORDER — GABAPENTIN 100 MG/1
100 CAPSULE ORAL 3 TIMES DAILY
Qty: 90 CAPSULE | Refills: 0 | Status: SHIPPED | OUTPATIENT
Start: 2023-09-05 | End: 2023-10-05

## 2023-09-05 ASSESSMENT — ENCOUNTER SYMPTOMS
GASTROINTESTINAL NEGATIVE: 1
COUGH: 1
BACK PAIN: 0

## 2023-09-05 NOTE — PROGRESS NOTES
neuropathy    Medications reviewed. Patient some fatigue side effects with medications. Patient states he is taking medications as prescribed. Hedenies receiving pain medications from other sources. He denies any ER visits since last visit. Pain scale with out pain medications or at its worst is 0/10. The patienthas No Known Allergies. Subjective:      Review of Systems   Constitutional: Negative. HENT: Negative. Eyes:  Positive for visual disturbance. Wears corrective glasses   Respiratory:  Positive for cough. Having phlemb discharge clear, + tobacco use    Cardiovascular:  Positive for leg swelling. Gastrointestinal: Negative. Genitourinary: Negative. Musculoskeletal:  Positive for arthralgias and myalgias. Negative for back pain, gait problem, joint swelling, neck pain and neck stiffness. Intermittent low back pain. No assist devices    Skin: Negative. Neurological:  Positive for weakness and numbness. Negative for headaches. Psychiatric/Behavioral: Negative. Objective:     Vitals:    09/05/23 1017   BP: 132/78   Weight: 218 lb (98.9 kg)   Height: 5' 7.99\" (1.727 m)       Physical Exam  Constitutional:       Appearance: Normal appearance. HENT:      Head: Normocephalic and atraumatic. Mouth/Throat:      Mouth: Mucous membranes are moist.   Eyes:      Extraocular Movements: Extraocular movements intact. Pupils: Pupils are equal, round, and reactive to light. Cardiovascular:      Rate and Rhythm: Normal rate and regular rhythm. Pulses: Normal pulses. Heart sounds: Normal heart sounds. Pulmonary:      Effort: Pulmonary effort is normal.      Breath sounds: Normal breath sounds. Abdominal:      General: Abdomen is flat. Palpations: Abdomen is soft. Musculoskeletal:         General: No tenderness. Right hand: Bony tenderness present. No deformity. Decreased strength. Decreased sensation.       Left hand: Bony

## 2023-09-06 DIAGNOSIS — G91.9 HYDROCEPHALUS DUE TO ABNORMALITY OF FLOW CEREBROSPINAL FLUID (HCC): Primary | ICD-10-CM

## 2023-09-07 ENCOUNTER — TELEPHONE (OUTPATIENT)
Dept: RHEUMATOLOGY | Age: 83
End: 2023-09-07

## 2023-09-07 NOTE — TELEPHONE ENCOUNTER
----- Message from SANGITA Lewis CNP sent at 9/6/2023 12:13 PM EDT -----  Blood testing with mildly elevated inflammatory marker, but no other significant abnormalities.

## 2023-09-21 ENCOUNTER — OFFICE VISIT (OUTPATIENT)
Dept: NEUROSURGERY | Age: 83
End: 2023-09-21
Payer: MEDICARE

## 2023-09-21 VITALS
WEIGHT: 215 LBS | BODY MASS INDEX: 32.58 KG/M2 | SYSTOLIC BLOOD PRESSURE: 153 MMHG | DIASTOLIC BLOOD PRESSURE: 73 MMHG | HEIGHT: 68 IN | HEART RATE: 59 BPM

## 2023-09-21 DIAGNOSIS — M48.02 CERVICAL SPINAL STENOSIS: Primary | ICD-10-CM

## 2023-09-21 PROCEDURE — 4004F PT TOBACCO SCREEN RCVD TLK: CPT | Performed by: PHYSICIAN ASSISTANT

## 2023-09-21 PROCEDURE — 1123F ACP DISCUSS/DSCN MKR DOCD: CPT | Performed by: PHYSICIAN ASSISTANT

## 2023-09-21 PROCEDURE — G8427 DOCREV CUR MEDS BY ELIG CLIN: HCPCS | Performed by: PHYSICIAN ASSISTANT

## 2023-09-21 PROCEDURE — 99214 OFFICE O/P EST MOD 30 MIN: CPT | Performed by: PHYSICIAN ASSISTANT

## 2023-09-21 PROCEDURE — G8417 CALC BMI ABV UP PARAM F/U: HCPCS | Performed by: PHYSICIAN ASSISTANT

## 2023-09-21 NOTE — PROGRESS NOTES
[address] [address] [place] [facility] [address]  Dept: [phone]  Dept Fax: [phone]  Loc: [phone] [address] Follow Visit  Visit Date: 9/21/2023      Bela Barragan  is a 82 y.o. male who is returning to the office today for a follow-up visit to address neck pain. He was most recently seen and evaluated in our office setting on 8/18/2023 where we reviewed the most recent MRI and CT scan with an upcoming pain management appointment. He arrives today unaccompanied and ambulating without assistance with pain very well controlled. He was not able to obtain the imaging due to some confusion with the imaging center located at [facility] in [place]. He stated at today's visit that he would like to forego any further discussions involving a surgical option in favor of a more conservative approach that will include [facility] pain management. He has stated that he would like to exhaust conservative therapies before considering a surgical option and based on this assertion and request we have agreed to follow-up with him as needed with encouragement for him to reach out to our service with any additional questions or concerns or should he experience any significant changes. He remains very happy with that plan moving forward and with today's appointment having questions and concerns addressed and answered. Patient was evaluated today and is doing well overall. No new complaints were voiced. Patient  lives with their family  Wound: none  Follow-up Studies: No orders of the defined types were placed in this encounter. Assessment/Plan:  Status Post cervical radiculopathy follow-up  Doing well overall  Encouraged gradual increase in physical and mental activity. Fall precaution and home safety education provided to patient.   Follow-up: Future appointments will be scheduled as needed with encouragement for him to keep
no rash/no itching/no dryness

## 2023-10-04 RX ORDER — GABAPENTIN 100 MG/1
100 CAPSULE ORAL 3 TIMES DAILY
Qty: 90 CAPSULE | Refills: 0 | Status: SHIPPED | OUTPATIENT
Start: 2023-10-05 | End: 2023-11-04

## 2023-10-04 NOTE — TELEPHONE ENCOUNTER
OARRS reviewed. UDS: no data . Last seen: 9/5/2023.  Follow-up:   Future Appointments   Date Time Provider [address]   10/26/2023  9:40 AM Gisel Helton APRN - CNP N [facility] Rheum [facility]   11/6/2023 10:30 AM Mary Dang APRN - CNP N [facility] Pain [facility]

## 2023-10-04 NOTE — TELEPHONE ENCOUNTER
Mae Adams called requesting a refill on the following medications:  Requested Prescriptions     Pending Prescriptions Disp Refills    gabapentin (NEURONTIN) 100 MG capsule 90 capsule 0     Sig: Take 1 capsule by mouth 3 times daily for 30 days.      Pharmacy verified:  [place]  [facility]/pharmacy #[id number] - [address] [phone]    Date of last visit: 09/05/2023  Date of next visit (if applicable): 82/0/9708

## 2023-10-26 ENCOUNTER — OFFICE VISIT (OUTPATIENT)
Dept: RHEUMATOLOGY | Age: 83
End: 2023-10-26
Payer: MEDICARE

## 2023-10-26 VITALS
BODY MASS INDEX: 32.74 KG/M2 | OXYGEN SATURATION: 94 % | WEIGHT: 216 LBS | DIASTOLIC BLOOD PRESSURE: 62 MMHG | SYSTOLIC BLOOD PRESSURE: 130 MMHG | HEART RATE: 51 BPM | HEIGHT: 68 IN

## 2023-10-26 DIAGNOSIS — G89.29 CHRONIC MIDLINE LOW BACK PAIN WITHOUT SCIATICA: ICD-10-CM

## 2023-10-26 DIAGNOSIS — M48.02 CERVICAL SPINAL STENOSIS: ICD-10-CM

## 2023-10-26 DIAGNOSIS — M54.50 CHRONIC MIDLINE LOW BACK PAIN WITHOUT SCIATICA: ICD-10-CM

## 2023-10-26 DIAGNOSIS — M54.12 CERVICAL RADICULOPATHY: ICD-10-CM

## 2023-10-26 DIAGNOSIS — R29.898 BILATERAL ARM WEAKNESS: ICD-10-CM

## 2023-10-26 DIAGNOSIS — M25.50 POLYARTHRALGIA: Primary | ICD-10-CM

## 2023-10-26 DIAGNOSIS — M62.81 HAND MUSCLE WEAKNESS: ICD-10-CM

## 2023-10-26 PROCEDURE — 1123F ACP DISCUSS/DSCN MKR DOCD: CPT | Performed by: NURSE PRACTITIONER

## 2023-10-26 PROCEDURE — G8484 FLU IMMUNIZE NO ADMIN: HCPCS | Performed by: NURSE PRACTITIONER

## 2023-10-26 PROCEDURE — G8417 CALC BMI ABV UP PARAM F/U: HCPCS | Performed by: NURSE PRACTITIONER

## 2023-10-26 PROCEDURE — 4004F PT TOBACCO SCREEN RCVD TLK: CPT | Performed by: NURSE PRACTITIONER

## 2023-10-26 PROCEDURE — 99214 OFFICE O/P EST MOD 30 MIN: CPT | Performed by: NURSE PRACTITIONER

## 2023-10-26 PROCEDURE — G8427 DOCREV CUR MEDS BY ELIG CLIN: HCPCS | Performed by: NURSE PRACTITIONER

## 2023-10-26 ASSESSMENT — ENCOUNTER SYMPTOMS
EYE ITCHING: 0
DIARRHEA: 0
NAUSEA: 0
BACK PAIN: 1
COUGH: 1
SHORTNESS OF BREATH: 1
EYE PAIN: 0
TROUBLE SWALLOWING: 0
CONSTIPATION: 0
ABDOMINAL PAIN: 0

## 2023-10-26 NOTE — PROGRESS NOTES
muscle wasting intrinsic hand muscles and forearms    Lower extremities:  HIPS nontender  KNEES nontender  ANKLES nontender   FEET : nontneder     Spine:   nontender       LABS    CBC  Lab Results   Component Value Date/Time    WBC 6.5 09/05/2023 10:53 AM    RBC 4.50 09/05/2023 10:53 AM    RBC 4.43 01/17/2023 09:15 AM    HGB 14.2 09/05/2023 10:53 AM    HCT 43.7 09/05/2023 10:53 AM    MCV 97.1 09/05/2023 10:53 AM    MCH 31.6 09/05/2023 10:53 AM    MCHC 32.5 09/05/2023 10:53 AM    RDW 12.7 01/17/2023 09:15 AM     09/05/2023 10:53 AM    MPV 11.0 09/05/2023 10:53 AM       CMP  Lab Results   Component Value Date/Time    CALCIUM 9.6 09/05/2023 10:53 AM    LABALBU 4.0 09/05/2023 10:53 AM    PROT 7.1 09/05/2023 10:53 AM     09/05/2023 10:53 AM    K 4.6 09/05/2023 10:53 AM    CO2 27 09/05/2023 10:53 AM     09/05/2023 10:53 AM    BUN 13 09/05/2023 10:53 AM    CREATININE 0.5 09/05/2023 10:53 AM    ALKPHOS 85 09/05/2023 10:53 AM    ALT 19 09/05/2023 10:53 AM    AST 21 09/05/2023 10:53 AM       HgBA1c: No components found for: \"HGBA1C\"    No results found for: \"VITD25\"      No results found for: \"ANASCRN\"  No results found for: \"SSA\"  No results found for: \"SSB\"  No results found for: \"ANTI-SMITH\"  No results found for: \"DSDNAAB\"   No results found for: \"ANTIRNP\"  No results found for: \"C3\", \"C4\"  No results found for: \"CCPAB\"  No results found for: \"RF\"    No components found for: \"CANCASCRN\", \"APANCASCRN\"  Lab Results   Component Value Date    SEDRATE 22 (H) 09/05/2023     Lab Results   Component Value Date    CRP 0.40 09/05/2023       RADIOLOGY:           EMG 3/9/2023        MRI cervical spine 4/8/2023        ASSESSMENT/PLAN    Assessment   Plan     Polyarthralgia  ESR elevation  LDH and CK elevation (mild)  - hand and low back. Back pain x 8 years, hands started in 2021. Weakness of hands, forearm atrophy. Denies AM stiffness. + gelling. Relief w/ mobic. Exam w/ no synovitis.  + tender MTPs and finkelstein

## 2023-11-06 ENCOUNTER — OFFICE VISIT (OUTPATIENT)
Dept: PHYSICAL MEDICINE AND REHAB | Age: 83
End: 2023-11-06
Payer: MEDICARE

## 2023-11-06 VITALS
SYSTOLIC BLOOD PRESSURE: 122 MMHG | HEIGHT: 68 IN | BODY MASS INDEX: 32.74 KG/M2 | DIASTOLIC BLOOD PRESSURE: 62 MMHG | WEIGHT: 216.05 LBS

## 2023-11-06 DIAGNOSIS — M48.02 CERVICAL SPINAL STENOSIS: ICD-10-CM

## 2023-11-06 DIAGNOSIS — G56.03 BILATERAL CARPAL TUNNEL SYNDROME: ICD-10-CM

## 2023-11-06 DIAGNOSIS — R20.0 BILATERAL HAND NUMBNESS: Primary | ICD-10-CM

## 2023-11-06 DIAGNOSIS — M54.12 CERVICAL RADICULITIS: ICD-10-CM

## 2023-11-06 PROCEDURE — 99214 OFFICE O/P EST MOD 30 MIN: CPT | Performed by: NURSE PRACTITIONER

## 2023-11-06 PROCEDURE — 1123F ACP DISCUSS/DSCN MKR DOCD: CPT | Performed by: NURSE PRACTITIONER

## 2023-11-06 PROCEDURE — 4004F PT TOBACCO SCREEN RCVD TLK: CPT | Performed by: NURSE PRACTITIONER

## 2023-11-06 PROCEDURE — G8427 DOCREV CUR MEDS BY ELIG CLIN: HCPCS | Performed by: NURSE PRACTITIONER

## 2023-11-06 PROCEDURE — G8484 FLU IMMUNIZE NO ADMIN: HCPCS | Performed by: NURSE PRACTITIONER

## 2023-11-06 PROCEDURE — G8417 CALC BMI ABV UP PARAM F/U: HCPCS | Performed by: NURSE PRACTITIONER

## 2023-11-06 RX ORDER — GABAPENTIN 100 MG/1
100 CAPSULE ORAL 3 TIMES DAILY
Qty: 90 CAPSULE | Refills: 0 | Status: SHIPPED | OUTPATIENT
Start: 2023-11-06 | End: 2023-12-06

## 2023-11-06 ASSESSMENT — ENCOUNTER SYMPTOMS
COUGH: 1
BACK PAIN: 0
GASTROINTESTINAL NEGATIVE: 1

## 2023-11-06 NOTE — PROGRESS NOTES
[address]  [facility]  [address] [facility]  Dept: [phone]  Dept Fax: [phone]: [phone]    Visit Date: 11/6/2023    Functionality Assessment/Goals Worksheet     On a scale of 0 (Does not Interfere) to 10 (Completely Interferes)     1. Which number describes how during the past week pain has interfered with       the following:  A. General Activity:  2  B. Mood: 2  C. Walking Ability:  3  D. Normal Work (Includes both work outside the home and housework):  2  E. Relations with Other People:   8  F. Sleep:   1  G. Enjoyment of Life:   1    2. Patient Prefers to Take their Pain Medications:     [x]  On a regular basis   [x]  Only when necessary    []  Does not take pain medications    3. What are the Patient's Goals/Expectations for Visiting Pain Management? []  Learn about my pain    [x]  Receive Medication   []  Physical Therapy     []  Treat Depression   [x]  Receive Injections    []  Treat Sleep   []  Deal with Anxiety and Stress   []  Treat Opoid Dependence/Addiction   []  Other:      HPI:   Rodrigo Sanderson is a 82 y.o. male is here today for    Chief Complaint: Hand numbness     HPI   2 month FU. Continues to mainly just have hand numbness and hand weakness. Denies any otherpains. Denies any neck pain     Continues daily home exercises learned in therapy. Overall pain remains tolerable. Continues Neurontin 100 mg TID. Pain increases with deneis any aggravating symptoms maybe cold weather       Radiology:  Reviewed Cervical MRI and Cervical CT scan  Severe degenerative changes, arthritis and multiple areas of spinal stenosis and severe foraminal stenosis   EMG reviewed   + chronic moderate severe C5-C7 cervical radiculopathy and bilateral median neuropathy    Medications reviewed. Patient denies side effects with medications. Patient states he is taking medications as prescribed.

## 2023-12-05 RX ORDER — GABAPENTIN 100 MG/1
100 CAPSULE ORAL 3 TIMES DAILY
Qty: 90 CAPSULE | Refills: 0 | Status: SHIPPED | OUTPATIENT
Start: 2023-12-06 | End: 2024-01-05

## 2023-12-05 NOTE — TELEPHONE ENCOUNTER
[facility] called requesting a refill on the following medications:  Requested Prescriptions     Pending Prescriptions Disp Refills    gabapentin (NEURONTIN) 100 MG capsule 90 capsule 0     Sig: Take 1 capsule by mouth 3 times daily for 30 days. Pharmacy verified:[facility]  . pv      Date of last visit: 11/6/23  Date of next visit (if applicable): 1/19/2620

## 2024-01-08 NOTE — TELEPHONE ENCOUNTER
John López called requesting a refill on the following medications:  Requested Prescriptions     Pending Prescriptions Disp Refills    gabapentin (NEURONTIN) 100 MG capsule 90 capsule 0     Sig: Take 1 capsule by mouth 3 times daily for 30 days.     [facility] [place]   .[email or web address]      Date of last visit: 11/6/2023  Date of next visit (if applicable): 1/16/2024

## 2024-01-09 RX ORDER — GABAPENTIN 100 MG/1
100 CAPSULE ORAL 3 TIMES DAILY
Qty: 90 CAPSULE | Refills: 0 | Status: SHIPPED | OUTPATIENT
Start: 2024-01-09 | End: 2024-02-08

## 2024-01-09 NOTE — TELEPHONE ENCOUNTER
OARRS reviewed.    Last seen: 11/6/2023. Follow-up:   Future Appointments   Date Time Provider Department Center   1/16/2024 11:45 AM Sami Dang, APRN - CNP N [facility] Pain [facility] - [place]

## 2024-01-16 ENCOUNTER — OFFICE VISIT (OUTPATIENT)
Dept: PHYSICAL MEDICINE AND REHAB | Age: 84
End: 2024-01-16
Payer: MEDICARE

## 2024-01-16 VITALS
HEIGHT: 68 IN | WEIGHT: 216.05 LBS | BODY MASS INDEX: 32.74 KG/M2 | SYSTOLIC BLOOD PRESSURE: 124 MMHG | DIASTOLIC BLOOD PRESSURE: 80 MMHG

## 2024-01-16 DIAGNOSIS — R20.0 BILATERAL HAND NUMBNESS: Primary | ICD-10-CM

## 2024-01-16 DIAGNOSIS — M48.02 CERVICAL SPINAL STENOSIS: ICD-10-CM

## 2024-01-16 DIAGNOSIS — G56.03 BILATERAL CARPAL TUNNEL SYNDROME: ICD-10-CM

## 2024-01-16 DIAGNOSIS — M54.12 CERVICAL RADICULITIS: ICD-10-CM

## 2024-01-16 DIAGNOSIS — R29.898 HAND WEAKNESS: ICD-10-CM

## 2024-01-16 PROCEDURE — G8484 FLU IMMUNIZE NO ADMIN: HCPCS | Performed by: NURSE PRACTITIONER

## 2024-01-16 PROCEDURE — 1123F ACP DISCUSS/DSCN MKR DOCD: CPT | Performed by: NURSE PRACTITIONER

## 2024-01-16 PROCEDURE — G8417 CALC BMI ABV UP PARAM F/U: HCPCS | Performed by: NURSE PRACTITIONER

## 2024-01-16 PROCEDURE — G8427 DOCREV CUR MEDS BY ELIG CLIN: HCPCS | Performed by: NURSE PRACTITIONER

## 2024-01-16 PROCEDURE — 99214 OFFICE O/P EST MOD 30 MIN: CPT | Performed by: NURSE PRACTITIONER

## 2024-01-16 PROCEDURE — 4004F PT TOBACCO SCREEN RCVD TLK: CPT | Performed by: NURSE PRACTITIONER

## 2024-01-16 ASSESSMENT — ENCOUNTER SYMPTOMS
COUGH: 1
BACK PAIN: 0
GASTROINTESTINAL NEGATIVE: 1

## 2024-01-16 NOTE — PROGRESS NOTES
[facility] [place] [facility]  [facility]  [address]  [facility] [address]  Dept: [phone]  Dept Fax: [phone]  Loc: [phone]    Visit Date: 1/16/2024    Functionality Assessment/Goals Worksheet     On a scale of 0 (Does not Interfere) to 10 (Completely Interferes)     1.  Which number describes how during the past week pain has interfered with       the following:  A.  General Activity:  3  B.  Mood: 2  C.  Walking Ability:  3  D.  Normal Work (Includes both work outside the home and housework):  3  E.  Relations with Other People:   2  F.  Sleep:   1  G.  Enjoyment of Life:   3    2.  Patient Prefers to Take their Pain Medications:     [x]  On a regular basis   []  Only when necessary    []  Does not take pain medications    3.  What are the Patient's Goals/Expectations for Visiting Pain Management?     [x]  Learn about my pain    []  Receive Medication   []  Physical Therapy     []  Treat Depression   []  Receive Injections    []  Treat Sleep   []  Deal with Anxiety and Stress   []  Treat Opoid Dependence/Addiction   []  Other:        HPI:   John López is a 83 y.o. male is here today for    Chief Complaint: Hand numbness     HPI   2.5 month FU. Patient denies any pain at all. Continues to mainly just have hand numbness and hand weakness. Denies any neck pain     Continues Neurontin 100 mg TID. Discussed we can slowly titrate it up as he feels that it does effect memory at time.   Pain increases with cold weather     Radiology:  Reviewed Cervical MRI and Cervical CT scan  Severe degenerative changes, arthritis and multiple areas of spinal stenosis and severe foraminal stenosis   EMG reviewed   + chronic moderate severe C5-C7 cervical radiculopathy and bilateral median neuropathy    Medications reviewed. Patient gets trouble thinking at times side effects with medications. Patient states he is taking medications as

## 2024-01-31 RX ORDER — GABAPENTIN 100 MG/1
100 CAPSULE ORAL 3 TIMES DAILY
Qty: 90 CAPSULE | Refills: 0 | OUTPATIENT
Start: 2024-01-31 | End: 2024-03-01

## 2024-01-31 NOTE — TELEPHONE ENCOUNTER
John López called requesting a refill on the following medications:  Requested Prescriptions     Pending Prescriptions Disp Refills    gabapentin (NEURONTIN) 100 MG capsule 90 capsule 0     Sig: Take 1 capsule by mouth 3 times daily for 30 days.     [facility] [place]  .[facility]      Date of last visit: 1-16-24  Date of next visit (if applicable): 3/26/2024

## 2024-02-02 RX ORDER — GABAPENTIN 100 MG/1
CAPSULE ORAL
Qty: 120 CAPSULE | Refills: 0 | Status: SHIPPED | OUTPATIENT
Start: 2024-02-02 | End: 2024-03-03

## 2024-02-02 NOTE — TELEPHONE ENCOUNTER
OARRS reviewed. UDS:no data found   Last seen: 1/16/2024. Follow-up:   Future Appointments   Date Time Provider Department Center   3/26/2024 12:00 PM Sami Dang, APRN - CNP N [facility] Pain [facility] [place]    Pt. Had increase for 100mg tid to 100mg in am,100mg in after noon and 200mg at hs according to your last note.. Needs early fill

## 2024-03-26 ENCOUNTER — OFFICE VISIT (OUTPATIENT)
Dept: PHYSICAL MEDICINE AND REHAB | Age: 84
End: 2024-03-26
Payer: MEDICARE

## 2024-03-26 VITALS
BODY MASS INDEX: 32.74 KG/M2 | WEIGHT: 216.05 LBS | DIASTOLIC BLOOD PRESSURE: 72 MMHG | SYSTOLIC BLOOD PRESSURE: 128 MMHG | HEIGHT: 68 IN

## 2024-03-26 DIAGNOSIS — K86.89 PANCREATIC MASS: ICD-10-CM

## 2024-03-26 DIAGNOSIS — R29.898 HAND WEAKNESS: ICD-10-CM

## 2024-03-26 DIAGNOSIS — G56.03 BILATERAL CARPAL TUNNEL SYNDROME: ICD-10-CM

## 2024-03-26 DIAGNOSIS — M48.02 CERVICAL SPINAL STENOSIS: ICD-10-CM

## 2024-03-26 DIAGNOSIS — R20.0 BILATERAL HAND NUMBNESS: Primary | ICD-10-CM

## 2024-03-26 DIAGNOSIS — M54.12 CERVICAL RADICULITIS: ICD-10-CM

## 2024-03-26 PROCEDURE — G8417 CALC BMI ABV UP PARAM F/U: HCPCS | Performed by: NURSE PRACTITIONER

## 2024-03-26 PROCEDURE — G8484 FLU IMMUNIZE NO ADMIN: HCPCS | Performed by: NURSE PRACTITIONER

## 2024-03-26 PROCEDURE — 4004F PT TOBACCO SCREEN RCVD TLK: CPT | Performed by: NURSE PRACTITIONER

## 2024-03-26 PROCEDURE — 1123F ACP DISCUSS/DSCN MKR DOCD: CPT | Performed by: NURSE PRACTITIONER

## 2024-03-26 PROCEDURE — 99214 OFFICE O/P EST MOD 30 MIN: CPT | Performed by: NURSE PRACTITIONER

## 2024-03-26 PROCEDURE — G8427 DOCREV CUR MEDS BY ELIG CLIN: HCPCS | Performed by: NURSE PRACTITIONER

## 2024-03-26 RX ORDER — GABAPENTIN 100 MG/1
200 CAPSULE ORAL 3 TIMES DAILY
Qty: 180 CAPSULE | Refills: 2 | Status: SHIPPED | OUTPATIENT
Start: 2024-03-26 | End: 2024-06-24

## 2024-03-26 ASSESSMENT — ENCOUNTER SYMPTOMS
BACK PAIN: 0
NAUSEA: 1
COUGH: 1

## 2024-03-26 NOTE — PROGRESS NOTES
[facility]  [address]  [place] [id number]  Dept: [phone]  Dept Fax: [phone]  Loc: [phone]    Visit Date: 3/26/2024    Functionality Assessment/Goals Worksheet     On a scale of 0 (Does not Interfere) to 10 (Completely Interferes)     1.  Which number describes how during the past week pain has interfered with       the following:  A.  General Activity:  3  B.  Mood: 3  C.  Walking Ability:  3  D.  Normal Work (Includes both work outside the home and housework):  3  E.  Relations with Other People:   3  F.  Sleep:   3  G.  Enjoyment of Life:   3    2.  Patient Prefers to Take their Pain Medications:     [x]  On a regular basis   []  Only when necessary    []  Does not take pain medications    3.  What are the Patient's Goals/Expectations for Visiting Pain Management?     [x]  Learn about my pain    [x]  Receive Medication   []  Physical Therapy     []  Treat Depression   []  Receive Injections    []  Treat Sleep   []  Deal with Anxiety and Stress   []  Treat Opoid Dependence/Addiction   []  Other:      HPI:   John López is a 83 y.o. male is here today for    Chief Complaint: Hand numbness     HPI   2.5 month FU. Continues to mainly just have hand numbness and hand weakness. Denies any neck pain. States increases in Neurontin helped was 500 mg per was taking 200 mg in am 100 mg in afternoon, and 200 mg at bedtime.     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 Patient is not seeing Oncology reyes does not want to go through states that he is just going to let life run its coarse.     States numbness in hands is worse being off Neurontin. States off since Ash Wednesday during this episode. Is going to be on palliative care with [facility]